# Patient Record
Sex: FEMALE | Race: WHITE | NOT HISPANIC OR LATINO | URBAN - METROPOLITAN AREA
[De-identification: names, ages, dates, MRNs, and addresses within clinical notes are randomized per-mention and may not be internally consistent; named-entity substitution may affect disease eponyms.]

---

## 2017-05-11 ENCOUNTER — OFFICE VISIT - RIVER FALLS (OUTPATIENT)
Dept: FAMILY MEDICINE | Facility: CLINIC | Age: 22
End: 2017-05-11

## 2017-05-11 ASSESSMENT — MIFFLIN-ST. JEOR: SCORE: 1361.53

## 2018-03-09 ENCOUNTER — AMBULATORY - RIVER FALLS (OUTPATIENT)
Dept: FAMILY MEDICINE | Facility: CLINIC | Age: 23
End: 2018-03-09

## 2018-05-09 ENCOUNTER — OFFICE VISIT - RIVER FALLS (OUTPATIENT)
Dept: FAMILY MEDICINE | Facility: CLINIC | Age: 23
End: 2018-05-09

## 2022-02-12 VITALS
BODY MASS INDEX: 26.64 KG/M2 | HEART RATE: 72 BPM | DIASTOLIC BLOOD PRESSURE: 78 MMHG | WEIGHT: 148 LBS | SYSTOLIC BLOOD PRESSURE: 138 MMHG

## 2022-02-12 VITALS
HEART RATE: 114 BPM | OXYGEN SATURATION: 99 % | BODY MASS INDEX: 26.87 KG/M2 | SYSTOLIC BLOOD PRESSURE: 142 MMHG | HEIGHT: 62 IN | DIASTOLIC BLOOD PRESSURE: 82 MMHG | TEMPERATURE: 98.6 F | WEIGHT: 146 LBS

## 2022-02-15 NOTE — PROGRESS NOTES
Patient:   SID HO            MRN: [id number]            FIN: [id number]               Age:   21 years     Sex:  Female     :  1995   Associated Diagnoses:   Acne   Author:   Sanju Torres MD      Visit Information      Date of Service: 2017 04:01 pm  Performing Location: [facility]  Encounter#: [id number]      Primary Care Provider (PCP):  Not recorded.      Chief Complaint   2017 4:05 PM CDT    Pt presents today for a med check for her BC she has been taking for her Acne located on her face, and for forms to be completed and signed for her study abroad trip coming up. Patient states her acne is under great control and denies any acne today        History of Present Illness             The patient presents with acne, and for a medication check for her BC she has been taking for her Acne located on her face. Patient states her acne is under great control and denies any acne today. She denies any nausea as long as she eats breakfast.  The acne is located face.  The severity of symptom(s) associated with acne is mild.  The acne is not spreading.  The acne is improving.  Exacerbating factors consist of emotional stress and menstrual cycle.  Relieving factors consist of birth control.  Associated symptoms consist of none, denies facial pain, denies change in skin color, denies rash, denies skin lesion, denies scarring, denies dry skin and denies decrease in social interaction.  Additional pertinent history: none.  Patient states she is not sexually active.       Patient presents today to get a form completed and signed for her study abroad trip coming up in 2017 for 1 semester (about 4 months). She will be going to [facility] to finish her [employer] degree @ [employer]. Patient has been to [place] for 2 weeks in the past. Patient states she has been doing very well. She is okay with being away from home during the school year. This week has been final week @ [employer] for the Spring  2017 semester. Patient does not usually drink coffee besides during final weeks in school. She has been consuming lots of caffeine, that includes today. Pulse and BP are a little elevated which is normal per patient when she drinks lots of caffeine during finals week.      Review of Systems   Constitutional:  Negative, No fever, No chills.    Eye:  Negative.    Ear/Nose/Mouth/Throat:  Negative.    Respiratory:  Negative, No shortness of breath.    Cardiovascular:  Negative, No chest pain, No palpitations.    Gastrointestinal:  Negative, No nausea, No constipation.    Genitourinary:  Negative.    Hematology/Lymphatics:  Negative.    Endocrine:  Negative.    Immunologic:  Negative.    Musculoskeletal:  Negative.    Integumentary:  Negative, No rash, No skin lesion.    Neurologic:  Alert and oriented X4.    Psychiatric:  Negative, No anxiety, No depression.             Health Status   Allergies:    Allergic Reactions (Selected)  No Known Medication Allergies   Medications:  (Selected)   Prescriptions  Prescribed  Ortho Tri-Cyclen Lo oral tablet: 1 tab(s), PO, Daily, # 30 tab(s), 0 Refill(s), Type: Maintenance, Pharmacy: [facility], No further refills until seen in clinic, 1 tab(s) po daily      Histories   Past Medical History:    No active or resolved past medical history items have been selected or recorded.   Family History:    No family history items have been selected or recorded.   Procedure history:    No active procedure history items have been selected or recorded.   Social History:             No active social history items have been recorded.      Physical Examination   Vital Signs   5/11/2017 4:05 PM CDT Temperature Tympanic 98.6 DegF    Peripheral Pulse Rate 114 bpm  HI    HR Method Electronic    Systolic Blood Pressure 142 mmHg  HI    Diastolic Blood Pressure 82 mmHg    Mean Arterial Pressure 102 mmHg    BP Site Right arm    BP Method Manual    Oxygen Saturation 99 %      Measurements  from flowsheet : Measurements   5/11/2017 4:05 PM CDT Height Measured - Standard 61.75 in    Weight Measured - Standard 146 lb    BSA 1.7 m2    Body Mass Index 26.92 kg/m2    Body Mass Index Percentile 86.56      General:  Alert and oriented, No acute distress.    Eye:  Normal conjunctiva.    HENT:  Tympanic membranes are clear, Normal hearing.    Neck:  Supple, Non-tender.    Respiratory:  Lungs are clear to auscultation, Respirations are non-labored, Breath sounds are equal.    Cardiovascular:  Normal rate, Regular rhythm.    Integumentary:  Warm, Dry, No rash.    Neurologic:  Alert, Oriented.    Psychiatric:  Cooperative, Appropriate mood & affect, Normal judgment.       Impression and Plan   Diagnosis     Acne ([id number]-PK L70.9).     Course:  Improving, Progressing as expected.    Plan:  continue current medication.    Patient Instructions:       Counseled: Patient, Regarding diagnosis, Regarding treatment, Regarding medications, Activity, Verbalized understanding.    Plan:  Patient does not usually drink coffee besides during final weeks in school. She has been consuming lots of caffeine, that includes today. Pulse and BP are a little elevated which is normal per patient when she drinks lots of caffeine during finals week.    Study abroad in [place] and [place] and the importance of Hep A vaccine. Malaria discussed - not needed for her study abroad trip in Fall 2017    Form through [employer] filled out, copied and singed    Hep A #1 given today. RTC when get back from [place] [facility] for Hep A #2.    Medication management of acne with BC reviewed and discussed.     Acne on face looks great. No lesions seen.    Advised to continue taking the birth control for acne prevention.    RTC age 23 for a pap smear for screening of cervical cancer. Patient verbally understood.    Orders     Orders (Selected)   Outpatient Orders  Ordered  RTC (Request): RFV: LAB ONLY APPT - Hep A #2 per GTG, Return in 6 months  RTC  (Request): RFV: Pap smear screening, Return in Age 23 years  Prescriptions  Prescribed  Ortho Tri-Cyclen Lo oral tablet: 1 tab(s), PO, Daily, # 90 tab(s), 3 Refill(s), Type: Maintenance, Pharmacy: [facility], 1 tab(s) po daily.

## 2022-02-15 NOTE — LETTER
(Inserted Image. Unable to display)       May 23, 2019      SID HO  [address]  [address] [place], [place] [id number]          Dear SID,      Thank you for selecting [facility] for your healthcare needs.     Our records indicate you are due for the following services:     Annual Physical and Gynecologic Exam ~ Yearly wellness exams are important for your ongoing health and wellness.  This exam gives you the opportunity to meet with your Healthcare Provider to review your health, update immunizations and to recommend preventive screenings that you may be due for.  At your wellness visit, your Healthcare Provider will determine the need for a gynecologic exam and/or pap smear.    To schedule an appointment or if you have further questions, please contact your primary clinic:   [facility]       [phone]   [facility]       [phone]              [facility]     [phone]    Powered by [employer]    Sincerely,    Sanju Torres MD

## 2022-02-15 NOTE — NURSING NOTE
"Rck pts BP--148/80.   Per GTG, pt should RTC in 2wks for CSS BP check.   If still elevated, pt may need to have further workup.   Per pt, there is family hx of \"white coat\" syndrome.  "

## 2022-02-15 NOTE — PROGRESS NOTES
Chief Complaint    Refill ocp. Doing well.  History of Present Illness      Patient is here for follow-up on her oral birth control.  She has been on this for the last year for acne controlled.  She has been doing well with this.  Her acne is very well controlled.  She has no trouble with medication.  Her menses are regular.      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  Review of Systems      See HPI.  All other review of systems negative.  Physical Exam   Vitals & Measurements    HR: 72(Peripheral)  BP: 144/70     WT: 148 lb       Alert, oriented, no acute distress      Normal heart rate      Nonlabored breathing       No acne lesions present  Assessment/Plan       Encounter for contraceptive management (Z30.9)         Blood pressure is elevated and will have her return for recheck on her blood pressure.  If it remains elevated will need further workup.  Blood pressure is elevated starting oral birth control.  She will continue with her current OCP.  Is also       Orders:         Return to Clinic (Request), RFV: nurse only BP check per GTG., Return in 2 weeks  Patient Information     Name:SID HO      Address:      [address] [place] [id number]     Sex:Female     YOB: 1995     [phone]     Emergency Contact:PIPER JOHNSON     MRN:[id number]     FIN:[id number]     Location:[facility]     Date of Service:05/09/2018      Primary Care Physician:       NONE ,       Attending Physician:       Sanju Torres MD, [phone]  Problem List/Past Medical History    Ongoing     No qualifying data    Historical     No qualifying data  Medications        Ortho Tri-Cyclen Lo oral tablet: 1 tab(s), PO, Daily, 28 tab(s), 0 Refill(s).                Allergies    No Known Medication Allergies  Social History    Smoking Status - 05/09/2018     Never smoker     Alcohol      Never, 05/12/2017     Employment and Education       Student, 05/12/2017     Home and Environment      Marital status: Single., 05/12/2017     Nutrition and Health      Type of diet: Regular., 05/12/2017     Sexual      Sexually active: No. Contraceptive Use Details: Abstinence., 05/12/2017     Substance Abuse      Never, 05/12/2017     Tobacco      Never smoker, 05/12/2017  Family History    Psoriasis: Sister.    Father: History is negative    Brother: History is negative    Mother: History is negative  Immunizations      Vaccine Date Status      hepatitis A adult vaccine 03/09/2018 Given      hepatitis A adult vaccine 05/11/2017 Recorded      human papillomavirus vaccine 08/19/2015 Recorded      human papillomavirus vaccine 04/23/2015 Recorded      meningococcal conjugate vaccine 11/13/2014 Recorded      influenza virus vaccine, inactivated 11/13/2014 Recorded      human papillomavirus vaccine 11/13/2014 Recorded      varicella 08/06/2009 Recorded      meningococcal conjugate vaccine 08/06/2009 Recorded      tetanus/diphth/pertuss (Tdap) adult/adol 08/06/2009 Recorded      DTaP 06/03/2002 Recorded      MMR (measles/mumps/rubella) 06/03/2002 Recorded      IPV 06/03/2002 Recorded      varicella 08/20/1998 Recorded      DTaP 08/20/1998 Recorded      MMR (measles/mumps/rubella) 08/20/1998 Recorded      IPV 08/20/1998 Recorded      DTaP 01/21/1998 Recorded      hepatitis B pediatric vaccine 01/21/1998 Recorded      Hib (PRP-T) 01/21/1998 Recorded      DTaP 1997 Recorded      hepatitis B pediatric vaccine 1997 Recorded      Hib (PRP-T) 1997 Recorded      IPV 1997 Recorded      DTaP 1997 Recorded      Hib (PRP-T) 1997 Recorded      IPV 1997 Recorded      hepatitis B pediatric vaccine 1997 Recorded